# Patient Record
Sex: FEMALE | URBAN - METROPOLITAN AREA
[De-identification: names, ages, dates, MRNs, and addresses within clinical notes are randomized per-mention and may not be internally consistent; named-entity substitution may affect disease eponyms.]

---

## 2021-03-11 ENCOUNTER — OFFICE VISIT (OUTPATIENT)
Dept: URBAN - METROPOLITAN AREA CLINIC 37 | Facility: CLINIC | Age: 66
End: 2021-03-11

## 2021-03-11 ENCOUNTER — LAB OUTSIDE AN ENCOUNTER (OUTPATIENT)
Dept: URBAN - METROPOLITAN AREA CLINIC 37 | Facility: CLINIC | Age: 66
End: 2021-03-11

## 2021-03-11 ENCOUNTER — TELEPHONE ENCOUNTER (OUTPATIENT)
Dept: URBAN - METROPOLITAN AREA CLINIC 35 | Facility: CLINIC | Age: 66
End: 2021-03-11

## 2021-03-11 ENCOUNTER — LAB OUTSIDE AN ENCOUNTER (OUTPATIENT)
Dept: URBAN - METROPOLITAN AREA CLINIC 35 | Facility: CLINIC | Age: 66
End: 2021-03-11

## 2021-03-11 VITALS — BODY MASS INDEX: 21.16 KG/M2 | HEIGHT: 62 IN | WEIGHT: 115 LBS

## 2021-03-11 RX ORDER — BERBERIS VULGARIS ROOT BARK, BRYONIA ALBA ROOT, VERONICASTRUM VIRGINICUM ROOT, MANDRAGORA OFFICINARUM ROOT, TARAXACUM OFFICINALE, ACTIVATED CHARCOAL, CHELIDONIUM MAJUS, AND LYCOPODIUM CLAVATUM SPORE 6; 6; 6; 6; 6; 12; 12; 12 [HP_X]/1; [HP_X]/1; [HP_X]/1; [HP_X]/1; [HP_X]/1; [HP_X]/1; [HP_X]/1; [HP_X]/1
AS DIRECTED TABLET ORAL
Status: ACTIVE | COMMUNITY

## 2021-03-11 RX ORDER — AMLODIPINE AND BENAZEPRIL HYDROCHLORIDE 5; 20 MG/1; MG/1
AS DIRECTED CAPSULE ORAL
Status: ACTIVE | COMMUNITY

## 2021-03-11 RX ORDER — FLUTICASONE FUROATE AND VILANTEROL TRIFENATATE 100; 25 UG/1; UG/1
1 PUFF POWDER RESPIRATORY (INHALATION)
Status: ACTIVE | COMMUNITY

## 2021-03-11 RX ORDER — MULTIVITAMIN
1 TABLET TABLET ORAL ONCE A DAY
Qty: 30 | Status: ACTIVE | COMMUNITY

## 2021-03-11 RX ORDER — ALBUTEROL SULFATE 90 UG/1
1 PUFF AS NEEDED AEROSOL, METERED RESPIRATORY (INHALATION)
Status: ON HOLD | COMMUNITY

## 2021-03-11 NOTE — HPI-MIGRATED HPI
Initial consultation : Patient is here for -> referal from PCP for CHB and elevated liver enzyme Onset of symptoms: probably more than 1 year ago but has not never being treated.  HAO found recently on routine lab done at PCP on 02/19/2021, see below Patient has not been treated for CHB Family history of GI maglinancy:  denies;   Interim investigations : Labs done on: ->  * 02/19/2021 by PCP:  - CMP: Glu: 122 (H) ; BUN: 9 ; Cr: 0.65 ; Na: 137 ; K: 3.9 ; Alb: 4.4 ; Tbili: 1.1 ; ALP: 76 ; ALT: 46 (H) ; AST: 58 (H);   Interim investigations : Imaging studies: ->  * US Abd on 04/10/2020 by PCP: Limited for nonvisualization of pancreas. Hepatomegaly with splenic steanosis;

## 2021-04-15 ENCOUNTER — OFFICE VISIT (OUTPATIENT)
Dept: URBAN - METROPOLITAN AREA CLINIC 37 | Facility: CLINIC | Age: 66
End: 2021-04-15

## 2021-04-15 NOTE — HPI-MIGRATED HPI
Hep- B : Patient has been using -> ;   Hep- B : Diagnosis was made -> 1 year ago but pt was never treated for Hep B;   Hep- B : Patient denies any symptoms of -> jaundice ascites/abd. distension peripheral edema/swelling in legs hematemesis or melena weight loss change in stool color shortness of breath fever fatigue depression/Anxiety nausea/vomiting;   Hep- B : Prior Imaging studies showed: ->  * US Abd on 04/10/2020 by PCP: Limited for nonvisualization of pancreas. Hepatomegaly with splenic steanosis;   Hep- B : Patient is here for -> Initial consultation for hepatitis B;   Hep- B : Last follow up OV was -> 1 month ago;   Hep- B : Family history of liver or GI malignancy -> denies;   Hep- B : Patient denies/admits using supplemental herbal -> denies using supplemental herbal products??;   Hep- B : Patient currently has been on -> none;   Hep- B : Response to therapy has been -> ;   Follow up OV : Patient is here for a routine OV for -> elevated liver enzyme and  fatty liver;   Follow up OV : Last OV was -> 1 month ago;   Follow up OV : Patient is on -> none. Clinically monitor Patient is here to discuss recent lab and US abdomen done;   Interim investigations : Labs done on: ->  * 02/19/2021 by PCP:  - CMP: Glu: 122 (H) ; BUN: 9 ; Cr: 0.65 ; Na: 137 ; K: 3.9 ; Alb: 4.4 ; Tbili: 1.1 ; ALP: 76 ; ALT: 46 (H) ; AST: 58 (H). ;

## 2021-04-26 ENCOUNTER — LAB OUTSIDE AN ENCOUNTER (OUTPATIENT)
Dept: URBAN - METROPOLITAN AREA CLINIC 37 | Facility: CLINIC | Age: 66
End: 2021-04-26

## 2021-04-28 LAB
ALBUMIN/GLOBULIN RATIO: 1.5
ALBUMIN: 4.7
ALKALINE PHOSPHATASE: 79
ALPHA FETOPROTEIN,: 4.7
ALT: 53
AST: 70
BILIRUBIN, TOTAL: 0.7
BUN/CREATININE RATIO: (no result)
CALCIUM: 9.8
CARBON DIOXIDE: 26
CHLORIDE: 101
CREATININE: 0.68
EGFR AFRICAN AMERICAN: 105
EGFR NON-AFR. AMERICAN: 91
GLOBULIN: 3.2
GLUCOSE: 96
HEPATITIS A AB, TOTAL: REACTIVE
HEPATITIS A IGM: (no result)
HEPATITIS B SURFACE: REACTIVE
HEPATITIS B VIRUS DNA: 1.37
HEPATITIS B VIRUS DNA: 23
HEPATITIS C ANTIBODY: (no result)
HIV AG/AB, 4TH GEN: (no result)
POTASSIUM: 5
PROTEIN, TOTAL: 7.9
SIGNAL TO CUT-OFF: 0.02
SODIUM: 138
UREA NITROGEN (BUN): 10

## 2021-04-30 ENCOUNTER — DASHBOARD ENCOUNTERS (OUTPATIENT)
Age: 66
End: 2021-04-30

## 2021-05-03 PROBLEM — 61977001: Status: ACTIVE | Noted: 2021-03-10

## 2021-05-03 PROBLEM — 305058001: Status: ACTIVE | Noted: 2021-03-11

## 2021-05-03 PROBLEM — 197321007: Status: ACTIVE | Noted: 2021-03-10

## 2021-05-03 PROBLEM — 707724006: Status: ACTIVE | Noted: 2021-03-10

## 2021-05-04 ENCOUNTER — OFFICE VISIT (OUTPATIENT)
Dept: URBAN - METROPOLITAN AREA CLINIC 35 | Facility: CLINIC | Age: 66
End: 2021-05-04

## 2021-05-04 VITALS — WEIGHT: 116 LBS

## 2021-05-04 RX ORDER — METFORMIN HYDROCHLORIDE 500 MG/1
1 TABLET WITH MEALS TABLET, FILM COATED ORAL EVERY MORNING
Qty: 30 | Refills: 5 | OUTPATIENT
Start: 2021-05-03

## 2021-05-04 RX ORDER — ALBUTEROL SULFATE 90 UG/1
1 PUFF AS NEEDED AEROSOL, METERED RESPIRATORY (INHALATION)
COMMUNITY

## 2021-05-04 RX ORDER — FLUTICASONE FUROATE AND VILANTEROL TRIFENATATE 100; 25 UG/1; UG/1
1 PUFF POWDER RESPIRATORY (INHALATION)
COMMUNITY

## 2021-05-04 RX ORDER — MULTIVITAMIN
1 TABLET TABLET ORAL ONCE A DAY
Qty: 30 | COMMUNITY

## 2021-05-04 RX ORDER — BERBERIS VULGARIS ROOT BARK, BRYONIA ALBA ROOT, VERONICASTRUM VIRGINICUM ROOT, MANDRAGORA OFFICINARUM ROOT, TARAXACUM OFFICINALE, ACTIVATED CHARCOAL, CHELIDONIUM MAJUS, AND LYCOPODIUM CLAVATUM SPORE 6; 6; 6; 6; 6; 12; 12; 12 [HP_X]/1; [HP_X]/1; [HP_X]/1; [HP_X]/1; [HP_X]/1; [HP_X]/1; [HP_X]/1; [HP_X]/1
AS DIRECTED TABLET ORAL
COMMUNITY

## 2021-05-04 RX ORDER — AMLODIPINE AND BENAZEPRIL HYDROCHLORIDE 5; 20 MG/1; MG/1
AS DIRECTED CAPSULE ORAL
COMMUNITY

## 2021-05-04 NOTE — HPI-MIGRATED HPI
Hep- B : Patient denies/admits using supplemental herbal -> denies using supplemental herbal products;   Hep- B : Patient currently has been on -> none;   Hep- B : Response to therapy has been -> ;   Hep- B : Patient has been using -> ;   Hep- B : Last follow up OV was -> 1 month ago;   Hep- B : Patient is here for -> routine follow up for chronic hepatitis B;   Hep- B : Diagnosis was made -> 1 year ago but pt was never treated for Hep B;   Hep- B : Patient denies any symptoms of -> jaundice ascites/abd. distension peripheral edema/swelling in legs hematemesis or melena weight loss change in stool color shortness of breath fever fatigue depression/Anxiety nausea/vomiting;   Hep- B : Prior Imaging studies showed: ->  US Abd on 04/10/2020, No evidence of HCC;   Hep- B : Family history of liver or GI malignancy -> denies;   Follow up OV : Patient is here for a routine OV for -> elevated liver enzyme and fatty liver;   Follow up OV : Last OV was -> 1 month ago;   Follow up OV : Patient is on -> none. Clinically monitor Patient is here to discuss recent lab and US abdomen done;   Interim investigations : Labs done on: ->  * 04/26/2021, see below;   Interim investigations : Imaging studies: ->  * US Abd on 03/24/2021: moderate fatty liver. No focal hepatic lesion. Limited visualization of pancreas and aorta due to bowel gas;

## 2021-05-07 ENCOUNTER — TELEPHONE ENCOUNTER (OUTPATIENT)
Dept: URBAN - METROPOLITAN AREA CLINIC 35 | Facility: CLINIC | Age: 66
End: 2021-05-07

## 2021-08-02 ENCOUNTER — LAB OUTSIDE AN ENCOUNTER (OUTPATIENT)
Dept: URBAN - METROPOLITAN AREA CLINIC 35 | Facility: CLINIC | Age: 66
End: 2021-08-02

## 2021-08-19 ENCOUNTER — OFFICE VISIT (OUTPATIENT)
Dept: URBAN - METROPOLITAN AREA CLINIC 37 | Facility: CLINIC | Age: 66
End: 2021-08-19